# Patient Record
Sex: MALE | Race: WHITE | URBAN - METROPOLITAN AREA
[De-identification: names, ages, dates, MRNs, and addresses within clinical notes are randomized per-mention and may not be internally consistent; named-entity substitution may affect disease eponyms.]

---

## 2022-10-21 ENCOUNTER — OFFICE VISIT (OUTPATIENT)
Dept: URBAN - METROPOLITAN AREA CLINIC 16 | Facility: CLINIC | Age: 82
End: 2022-10-21
Payer: MEDICARE

## 2022-10-21 DIAGNOSIS — H25.813 COMBINED FORMS OF AGE-RELATED CATARACT, BILATERAL: ICD-10-CM

## 2022-10-21 DIAGNOSIS — H43.12 VITREOUS HEMORRHAGE, LEFT EYE: Primary | ICD-10-CM

## 2022-10-21 DIAGNOSIS — H43.811 VITREOUS DEGENERATION, RIGHT EYE: ICD-10-CM

## 2022-10-21 PROCEDURE — 92134 CPTRZ OPH DX IMG PST SGM RTA: CPT

## 2022-10-21 PROCEDURE — 99204 OFFICE O/P NEW MOD 45 MIN: CPT

## 2022-10-21 ASSESSMENT — INTRAOCULAR PRESSURE
OD: 12
OS: 12

## 2022-10-21 NOTE — IMPRESSION/PLAN
Impression: Vitreous hemorrhage, left eye: H43.12.
- takes 400 mg ibuprofen nightly (-) DM or HTN
(-) hx of trauma
- no changes since first occurrence in August Plan: Pt educated on possible causes. Mac OCT and Optos ordered and reviewed. Poor view centrally OS. Recommended pt see Dr. Blanca Rico for further evaluation and management.

## 2022-10-21 NOTE — IMPRESSION/PLAN
Impression: Combined forms of age-related cataract, bilateral: H25.813. Plan: Pt educated on condition. No treatment recommended at this time. Monitor. Pt advised to RTC with any changes in vision.

## 2022-10-21 NOTE — IMPRESSION/PLAN
Impression: Vitreous degeneration, right eye: H43.811. Plan: Pt educated on condition. Reviewed signs and symptoms of retinal detachment. Pt advised to RTC ASAP with sudden decreased vision, loss of peripheral vision, or new onset flashes/significant floaters. Monitor yearly.

## 2022-10-24 ENCOUNTER — OFFICE VISIT (OUTPATIENT)
Dept: URBAN - METROPOLITAN AREA CLINIC 23 | Facility: CLINIC | Age: 82
End: 2022-10-24
Payer: MEDICARE

## 2022-10-24 DIAGNOSIS — H33.312 HORSESHOE TEAR OF RETINA WITHOUT DETACHMENT, LEFT EYE: Primary | ICD-10-CM

## 2022-10-24 DIAGNOSIS — H43.12 VITREOUS HEMORRHAGE, LEFT EYE: ICD-10-CM

## 2022-10-24 PROCEDURE — 92134 CPTRZ OPH DX IMG PST SGM RTA: CPT | Performed by: OPHTHALMOLOGY

## 2022-10-24 PROCEDURE — 99204 OFFICE O/P NEW MOD 45 MIN: CPT | Performed by: OPHTHALMOLOGY

## 2022-10-24 ASSESSMENT — INTRAOCULAR PRESSURE
OD: 15
OS: 15

## 2022-10-24 NOTE — IMPRESSION/PLAN
[facility] EMERGENCY DEPARTMENT    Thank you for your patience today.      You have been seen and evaluated. We reviewed the results from your visit in the emergency department. Your pain is from kidney stones. Please read the instructions provided, and if given prescriptions, take as instructed.     Please follow up with the urologist in 3-5 days. Call today or tomorrow to schedule an appointment. We have written for some pain medications to take as you attempt to pass your kidney stone. You should ensure that you stay very well hydrated while you attempt to pass this stone. You should strain your urine with a urine strainer to ensure passage of the kidney stone.    Remember, you care process does not end after your visit today. Please follow-up with your doctor within 1-2 days for a follow-up check to ensure you are  improving, to see if you need any further evaluation/testing, or to evaluate for any alternate diagnoses.     Please return to the emergency department if your symptoms are persisting for more than 48 hours, getting worse, or you begin having fevers or chills, or if you develop any other new or concerning symptoms as these could be signs of more serious medical illness. Fevers with a kidney stone always requires prompt reevaluation.    We hope you feel better.             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 Condition: new problem addtl w/u needed. Vision: vision not affected. Left. Plan: Discussed diagnosis in detail with patient. GONIO exam OS shows ? infra-temporal retinal tear. Discussed risks of progression. Recommend Laser treatment to repair the Retinal Tear in the LEFT EYE and reduce the risk of Retinal Detachment. Discussed risks/benefits of laser TX. All questions answered. Patient elects to proceed with recommendation. OCT shows stable OD, poor quality OS. Optos OD stable, OS shows vitreous hemorrhage. Pt asked if the laser will improve his vision. Advised patient that the laser will not affect his vision, his vision is blurry due to the vitreous hemorrhage. It will take time for the blood to clear up, he should slowly notice his vision clearing up, however the laser is to repair the tear, not improve his vision.

## 2022-10-24 NOTE — IMPRESSION/PLAN
Impression: Vitreous hemorrhage, left eye: H43.12 (Not related to DM) Condition: unstable. Vision: vision affected. (-) DM or HTN
(-) hx of trauma Plan: Discussed diagnosis in detail with patient. Advised patient that the blood in his eye is due to the retinal tear. After the laser to repair the tear, the blood should clear up on its own in time. Will continue to observe condition and or symptoms.

## 2022-11-22 ENCOUNTER — SURGERY (OUTPATIENT)
Dept: URBAN - METROPOLITAN AREA SURGERY 11 | Facility: SURGERY | Age: 82
End: 2022-11-22
Payer: MEDICARE

## 2022-11-22 PROCEDURE — 67145 PROPH RTA DTCHMNT PC: CPT | Performed by: OPHTHALMOLOGY

## 2022-12-13 ENCOUNTER — OFFICE VISIT (OUTPATIENT)
Dept: URBAN - METROPOLITAN AREA CLINIC 23 | Facility: CLINIC | Age: 82
End: 2022-12-13
Payer: MEDICARE

## 2022-12-13 DIAGNOSIS — H33.312 HORSESHOE TEAR OF RETINA WITHOUT DETACHMENT, LEFT EYE: Primary | ICD-10-CM

## 2022-12-13 DIAGNOSIS — H43.12 VITREOUS HEMORRHAGE, LEFT EYE: ICD-10-CM

## 2022-12-13 PROCEDURE — 99213 OFFICE O/P EST LOW 20 MIN: CPT | Performed by: OPHTHALMOLOGY

## 2022-12-13 PROCEDURE — 92134 CPTRZ OPH DX IMG PST SGM RTA: CPT | Performed by: OPHTHALMOLOGY

## 2022-12-13 ASSESSMENT — INTRAOCULAR PRESSURE
OS: 15
OD: 15

## 2022-12-13 NOTE — IMPRESSION/PLAN
Impression: Vitreous hemorrhage, left eye: H43.12 (Not related to DM) Condition: improving. Vision: vision affected. (-) DM or HTN
(-) hx of trauma Plan: Discussed diagnosis in detail with patient. Discussed risks of progression. Recommend observation OS - see notes above.

## 2022-12-13 NOTE — IMPRESSION/PLAN
Impression: s/p repair of Horseshoe tear of retina without detachment, left eye: H33.312. Left. Condition: resolved w/ laser tx. Vision: vision not affected. s/p PCA OS 11/22/22 Plan: Discussed diagnosis in detail with patient. Exam OS shows decreasing VH, laser tx. Discussed treatment options with patient. No treatment is required at this time. Discussed floaters should continue to dissipate. Will continue to observe condition and or symptoms. OCT OS shows stable and Optos OS shows stable w/ laser tx. Recommend a retina f/u in 6 weeks.